# Patient Record
Sex: FEMALE | Race: WHITE | URBAN - METROPOLITAN AREA
[De-identification: names, ages, dates, MRNs, and addresses within clinical notes are randomized per-mention and may not be internally consistent; named-entity substitution may affect disease eponyms.]

---

## 2023-08-11 ENCOUNTER — TELEPHONE (OUTPATIENT)
Age: 23
End: 2023-08-11

## 2023-08-11 RX ORDER — ARIPIPRAZOLE 5 MG/1
5 TABLET ORAL EVERY MORNING
COMMUNITY
Start: 2023-07-26

## 2023-08-11 RX ORDER — NORETHINDRONE ACETATE AND ETHINYL ESTRADIOL, AND FERROUS FUMARATE 1MG-20(24)
1 KIT ORAL DAILY
COMMUNITY
Start: 2023-06-30

## 2023-08-11 RX ORDER — SEMAGLUTIDE 1.7 MG/.75ML
INJECTION, SOLUTION SUBCUTANEOUS
COMMUNITY
Start: 2023-07-11

## 2023-08-22 ENCOUNTER — PROCEDURE VISIT (OUTPATIENT)
Age: 23
End: 2023-08-22
Payer: COMMERCIAL

## 2023-08-22 ENCOUNTER — TELEPHONE (OUTPATIENT)
Age: 23
End: 2023-08-22

## 2023-08-22 DIAGNOSIS — R42 LIGHTHEADED: Primary | ICD-10-CM

## 2023-08-22 PROCEDURE — 93660 TILT TABLE EVALUATION: CPT | Performed by: PSYCHIATRY & NEUROLOGY

## 2023-08-22 NOTE — TELEPHONE ENCOUNTER
Patient would like a call regarding her ANS testing she has today at 1pm she has some questions.     Please contact

## 2023-08-24 NOTE — PROGRESS NOTES
[facility]  [address] [facility], [address] [facility]  Susu Dietz  Phone: [phone]  FAX: [phone]     Clinical Autonomic Testing Report     Patient ID:  Edis Webb  [id number]  7 y.o.  2000     REFERRED BY: Apollo Blanc MD  PCP: Fatoumata Haile MD    Date of Testin2023      Indication/History:    Episodes of dizziness and increased heart rate    Patient is coming for syncope/autonomic dysfunction evaluation. Medications taken 48 hrs before the test: Zoloft, Abilify     Procedure: Referring provider only requested Head-Up Tilt Table Testing. It is performed by utilizing [employer], with established protocol. Result:HUT (head-up tilt) : [id number] BP and HR were measured, up to 15 minutes post tilt. There is an exaggerated increase in heart rate from baseline HR of 71 bpm to max HR of 144 bpm at 9 mins post tilt followed by a drop of blood pressure from baseline BP of 118/68 to BP of 80/62 at 10 mins post tilt. Impression:   ABNORMAL    There is an exaggerated sustained increase in heart rate (greater than 30 beats/min and increasing to 120 beats/min or greater) which can be seen in postural orthostatic tachycardia syndrome (POTS). 2.  There is note of a vasodepressor type orthostatic hypotension/syncope at the end of the tilt.          Wes Huff MD  Diplomate, American Board of Psychiatry and Neurology  Diplomate, Neuromuscular Medicine  Diplomate, American Board of Electrodiagnostic Medicine    Note: Raw Data will be scanned separately in Media